# Patient Record
Sex: MALE | Race: WHITE
[De-identification: names, ages, dates, MRNs, and addresses within clinical notes are randomized per-mention and may not be internally consistent; named-entity substitution may affect disease eponyms.]

---

## 2021-09-26 ENCOUNTER — HOSPITAL ENCOUNTER (EMERGENCY)
Dept: HOSPITAL 76 - ED | Age: 21
Discharge: HOME | End: 2021-09-26
Payer: COMMERCIAL

## 2021-09-26 VITALS — SYSTOLIC BLOOD PRESSURE: 148 MMHG | DIASTOLIC BLOOD PRESSURE: 97 MMHG

## 2021-09-26 DIAGNOSIS — Z20.822: ICD-10-CM

## 2021-09-26 DIAGNOSIS — B34.9: Primary | ICD-10-CM

## 2021-09-26 PROCEDURE — 99282 EMERGENCY DEPT VISIT SF MDM: CPT

## 2021-09-26 PROCEDURE — 99283 EMERGENCY DEPT VISIT LOW MDM: CPT

## 2021-09-26 NOTE — ED PHYSICIAN DOCUMENTATION
PD HPI URI





- Stated complaint


Stated Complaint: COVID SYMPTOMS





- Chief complaint


Chief Complaint: Heent





- History obtained from


History obtained from: Patient





- Additional information


Additional information: 





31-year-old gentleman sick since yesterday with nasal bastion and sore throat.  

Chills but no fevers or body aches.  His daughter is sick with a viral URI.  He 

needs Covid test.  He is in the [employer].  He has had 1 out of 2 moderna vaccines.





Review of Systems


Constitutional: reports: Chills.  denies: Fever


Nose: reports: Rhinorrhea / runny nose


Throat: reports: Sore throat





PD PAST MEDICAL HISTORY





- Past Medical History


Past Medical History: Yes


Psych: Depression





- Past Surgical History


Past Surgical History: No


HEENT: Tonsil/Adenoidectomy





- Present Medications


Home Medications: 


                                Ambulatory Orders











 Medication  Instructions  Recorded  Confirmed


 


Fluoxetine HCl [Prozac] 40 mg PO DAILY 09/26/21 09/26/21














- Allergies


Allergies/Adverse Reactions: 


                                    Allergies











Allergy/AdvReac Type Severity Reaction Status Date / Time


 


Penicillins Allergy  Rash Verified 09/26/21 17:02














- Social History


Does the pt smoke?: No


Smoking Status: Never smoker





PD ED PE NORMAL





- Vitals


Vital signs reviewed: Yes





- General


General: Alert and oriented X 3, No acute distress





- HEENT


HEENT: Pharynx benign (Oropharynx normal, surgically absent tonsils)





- Neck


Neck: Supple, no meningeal sign, No bony TTP





- Cardiac


Cardiac: RRR, No murmur





- Respiratory


Respiratory: No respiratory distress, Clear bilaterally





- Neuro


Neuro: Alert and oriented X 3, Normal speech





Results





- Vitals


Vitals: 





                               Vital Signs - 24 hr











  09/26/21





  17:00


 


Temperature 36.8 C


 


Heart Rate 99


 


Respiratory 12





Rate 


 


Blood Pressure 148/97 H


 


O2 Saturation 97








                                     Oxygen











O2 Source                      Room air

















Departure





- Departure


Disposition: 01 Home, Self Care


Clinical Impression: 


 Viral syndrome





Condition: Good


Record reviewed to determine appropriate education?: Yes


Instructions:  ED Viral Syndrome


Comments: 


You have a Covid test pending.  You need to self quarantine until the result is 

done and negative.  Do not leave your house.  Do not get near anybody.  The 

results should be done in 48 to 72 hours.  We will call with a positive result, 

the fastest way to get a negative result for confirmation though is to go to the

 hospital website at [email or web address], click on the my [email or web address] tab 

and sign up for the patient portal.





If any friends or family get sick and would like to have a Covid test done, but 

do not have signs or symptoms that would necessitate being hospitalized, we 

encourage testing through our coronavirus swabbing station, call [phone] to

 schedule an appointment.





Ibuprofen as needed for aches and pains.  Drink plenty of fluids.  Return if 

worse.


Forms:  Activity restrictions